# Patient Record
Sex: FEMALE | Race: WHITE | NOT HISPANIC OR LATINO | URBAN - METROPOLITAN AREA
[De-identification: names, ages, dates, MRNs, and addresses within clinical notes are randomized per-mention and may not be internally consistent; named-entity substitution may affect disease eponyms.]

---

## 2021-04-12 ENCOUNTER — OFFICE VISIT (OUTPATIENT)
Dept: OBSTETRICS AND GYNECOLOGY | Facility: CLINIC | Age: 21
End: 2021-04-12

## 2021-04-12 VITALS
DIASTOLIC BLOOD PRESSURE: 76 MMHG | SYSTOLIC BLOOD PRESSURE: 120 MMHG | BODY MASS INDEX: 24.62 KG/M2 | WEIGHT: 144.2 LBS | HEIGHT: 64 IN

## 2021-04-12 DIAGNOSIS — B37.0 ORAL THRUSH: ICD-10-CM

## 2021-04-12 DIAGNOSIS — B37.31 RECURRENT CANDIDIASIS OF VAGINA: Primary | ICD-10-CM

## 2021-04-12 PROCEDURE — 87210 SMEAR WET MOUNT SALINE/INK: CPT | Performed by: OBSTETRICS & GYNECOLOGY

## 2021-04-12 PROCEDURE — 83986 ASSAY PH BODY FLUID NOS: CPT | Performed by: OBSTETRICS & GYNECOLOGY

## 2021-04-12 PROCEDURE — 99203 OFFICE O/P NEW LOW 30 MIN: CPT | Performed by: OBSTETRICS & GYNECOLOGY

## 2021-04-12 RX ORDER — AMITRIPTYLINE HYDROCHLORIDE 10 MG/1
2 TABLET, FILM COATED ORAL NIGHTLY
COMMUNITY
Start: 2021-03-21

## 2021-04-12 RX ORDER — DIPHENHYDRAMINE, LIDOCAINE, NYSTATIN
10 KIT ORAL
COMMUNITY
Start: 2021-02-21

## 2021-04-12 NOTE — PROGRESS NOTES
Chief Complaint   Patient presents with   • Gynecologic Exam     New GYN, establish care.   • Vaginitis     referred for evaluation of recurrent vaginitis/yeast and oral thrush.       Kaya Dawson is a 20 y.o. year old  presenting to be seen for her gynecologic exam with Acute on Chronic problems of vaginal candidiasis (recurrent) and oral candidiasis (recurrent).  This patient has been managed by Dr. Babak Perry in [facility].  He has done PCR screening which has been negative for candidal species but positive for Enterococcus and E. coli as well as lactobacilli.  The patient has been treated with antifungal agents including Diflucan and with Metronidazole.  She has been treated with nystatin oral suspension.  She improves for a period of time and then has a recurrence.  She indicates that her sexual partner does not have evidence of infection.  She does bite her fingernails.  She has had a medical evaluation with no evidence of diabetes, or thyroid disease.  She indicates that her immune status was evaluated and is normal.  She does not use any chemotherapeutic agents.  She has cyclic menses without intermenstrual bleeding.  She does take Elavil, 20 mg daily.  She has discontinued oral contraceptives.      Prob List  Visit Dx  Meds      Problem list reviewed    Medications reviewed  SCREENING TESTS    Year 2012 2015 2016 2017 2018   Age                         PAP          Neg.               HPV high risk                         Mammogram                         BRANDI score                         Breast MRI                         Lipids                         Vitamin D                         Colonoscopy                         DEXA  Frax (hip/any)                         Ovarian Screen                             GYN screening history:  · Last pap: was done on approximately 2021 and the result was: normal  "PAP..    No Additional Complaints Reported    The following portions of the patient's history were reviewed and updated as appropriate:vital signs and   She  has a past medical history of Anxiety, Asthma, and Family history of breast cancer.  She does not have any pertinent problems on file.  She  has a past surgical history that includes Tichnor tooth extraction.  Her family history includes Breast cancer in her maternal grandmother; Ovarian cancer in her maternal grandmother.  She  reports that she has never smoked. She has never used smokeless tobacco. She reports previous alcohol use. She reports that she does not use drugs.  Current Outpatient Medications   Medication Sig Dispense Refill   • nystatin susp + lidocaine viscous (MAGIC MOUTHWASH) oral suspension Take 10 mL by mouth.     • amitriptyline (ELAVIL) 10 MG tablet Take 2 tablets by mouth Every Night.     • Probiotic Product (PROBIOTIC-10 PO) Probiotic 10 billion cell capsule   Take 1 capsule every day by oral route for 30 days.       No current facility-administered medications for this visit.     She has No Known Allergies..    Review of Systems  A review of systems was taken.  She denies cough, fever, shortness of breath, and loss of her sense of taste or smell  Constitutional: negative for fever, chills, activity change, appetite change, fatigue and unexpected weight change.  Respiratory: negative  Cardiovascular: negative  Gastrointestinal: negative  Genitourinary:positive for recurrent itching  Musculoskeletal:negative  Behavioral/Psych: positive for anxiety     Counseling/Anticipatory Guidance Discussed: nutrition, family planning/contraception, physical activity, healthy weight, immunizations and breast cancer and self breast exams    I have reviewed and confirmed the accuracy of the ROS as documented by the MA/LPN/RN LIBRA Infante MD    /76   Ht 162.6 cm (64\")   Wt 65.4 kg (144 lb 3.2 oz)   LMP 03/22/2021 (Exact Date)   Breastfeeding No "   BMI 24.75 kg/m²     MEDICALLY INDICATED   Physical Exam    General:  alert; cooperative; well developed; well nourished   Skin:  No suspicious lesions seen   Thyroid: normal to inspection and palpation   Lungs:  breathing is unlabored  clear to auscultation bilaterally   Heart:  regular rate and rhythm, S1, S2 normal, no murmur, click, rub or gallop  normal apical impulse   Breasts:  Not performed.   Abdomen: soft, non-tender; no masses  no umbilical or inguinal hernias are present  no hepato-splenomegaly   Pelvis: Clinical staff was present for exam  External genitalia:  normal appearance of the external genitalia including Bartholin's and Milligan's glands.  Vaginal:  normal pink mucosa without prolapse or lesions. pH = 4.0 wet prep done: normal epithelial cells are present;  Cervix:  normal appearance.  Uterus:  normal size, shape and consistency. anteverted;  Adnexa:  normal bimanual exam of the adnexa.  Rectal:  anus visually normal appearing. recto-vaginal exam unremarkable and confirms findings;     Results Review:    Labs  Imaging  Meds  Media :23}      Labs, PCR screening reviewed    Medications reviewed            ASSESSMENT  Problems Addressed this Visit        Genitourinary and Reproductive     Recurrent candidiasis of vagina - Primary       Other    Oral thrush      Diagnoses       Codes Comments    Recurrent candidiasis of vagina    -  Primary ICD-10-CM: B37.3  ICD-9-CM: 112.1     Oral thrush     ICD-10-CM: B37.0  ICD-9-CM: 112.0       Vaginal candidiasis and oral thrush are Acute on chronic problems with an uncertain prognosis.  This patient has been evaluated elsewhere, and that evaluation has been reviewed with her records.  I have reviewed with the patient that her PCR testing shows evidence of E. coli and Enterococcus which would come from a rectal source.  I have strongly urged her to avoid any rectal contamination.  I have asked her to attempt to stop biting her fingernails.  I have  instructed her to see ENT for evaluation of her recurrent thrush.  I have counseled the patient to be sure that she has had a thorough evaluation of her immune status.  I have advised the patient to initiate compounded boric acid suppositories, 600 mg 3 times a week for suppression.  Should this not be effective she will return.  Repeat PCR testing was done        Substance History:   reports that she has never smoked. She has never used smokeless tobacco.   reports previous alcohol use.   reports no history of drug use.    Substance use counseling is not indicated based on patient history.      PLAN    · Medications prescribed this encounter:  No orders of the defined types were placed in this encounter.  · PCR screening for candidal species  · Boric acid suppositories (compounded) 600 mg intravaginally 3 times a week  · Regular weight-bearing exercise  · Return 3 months if not improved.  *Please note that portions of this documentation may have been completed with a voice recognition program.  Efforts were made to edit this dictation, but occasional words may have been mistranscribed.       This note was electronically signed.    LIBRA Infante MD  April 12, 2021  11:09 EDT

## 2021-04-16 DIAGNOSIS — B37.31 VAGINAL CANDIDIASIS: Primary | ICD-10-CM

## 2021-04-16 RX ORDER — FLUCONAZOLE 150 MG/1
TABLET ORAL
Qty: 5 TABLET | Refills: 0 | Status: SHIPPED | OUTPATIENT
Start: 2021-04-16